# Patient Record
Sex: MALE | Race: BLACK OR AFRICAN AMERICAN | NOT HISPANIC OR LATINO | Employment: FULL TIME | URBAN - METROPOLITAN AREA
[De-identification: names, ages, dates, MRNs, and addresses within clinical notes are randomized per-mention and may not be internally consistent; named-entity substitution may affect disease eponyms.]

---

## 2024-09-05 ENCOUNTER — TELEPHONE (OUTPATIENT)
Dept: PRIMARY CARE CLINIC | Facility: CLINIC | Age: 24
End: 2024-09-05
Payer: MEDICAID

## 2024-09-05 NOTE — TELEPHONE ENCOUNTER
Called to inform pt of earliest appointment. Pt appointment is scheduled for 10/21/2024 8:40am. Pt voiced understanding.     ----- Message from Jatinder Robertson sent at 9/5/2024  4:21 PM CDT -----    Type:  Needs Medical Advice    Who Called: Erik   Symptoms (please be specific):    How long has patient had these symptoms:    Pharmacy name and phone #:    Would the patient rather a call back or a response via [facility]?   Best Call Back Number: [phone]  Additional Information:     Patient would like to know if he could be seen sooner. Patient is a new patient and he was prescrbre medication for ADD and wanted to attend school and needs medication to focus. Patient does have records from Pediatric doctor